# Patient Record
Sex: FEMALE | Race: BLACK OR AFRICAN AMERICAN
[De-identification: names, ages, dates, MRNs, and addresses within clinical notes are randomized per-mention and may not be internally consistent; named-entity substitution may affect disease eponyms.]

---

## 2021-08-02 ENCOUNTER — HOSPITAL ENCOUNTER (OUTPATIENT)
Dept: HOSPITAL 79 - HEART CORB | Age: 46
End: 2021-08-02
Attending: INTERNAL MEDICINE
Payer: COMMERCIAL

## 2021-08-02 DIAGNOSIS — R07.2: Primary | ICD-10-CM

## 2021-11-01 ENCOUNTER — HOSPITAL ENCOUNTER (OUTPATIENT)
Dept: GENERAL RADIOLOGY | Facility: HOSPITAL | Age: 46
Discharge: HOME | End: 2021-11-01

## 2021-11-01 ENCOUNTER — OFFICE VISIT (OUTPATIENT)
Dept: ORTHOPEDIC SURGERY | Facility: CLINIC | Age: 46
End: 2021-11-01

## 2021-11-01 VITALS
HEIGHT: 64 IN | HEART RATE: 65 BPM | BODY MASS INDEX: 49.51 KG/M2 | SYSTOLIC BLOOD PRESSURE: 138 MMHG | TEMPERATURE: 97.7 F | DIASTOLIC BLOOD PRESSURE: 79 MMHG | WEIGHT: 290 LBS

## 2021-11-01 DIAGNOSIS — M25.332 SCAPHOLUNATE DISSOCIATION, LEFT: ICD-10-CM
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
## 2021-11-01 DIAGNOSIS — S66.912A WRIST STRAIN, LEFT, INITIAL ENCOUNTER: ICD-10-CM

## 2021-11-01 DIAGNOSIS — M25.532 WRIST PAIN, ACUTE, LEFT: Primary | ICD-10-CM

## 2021-11-01 DIAGNOSIS — S56.912A FOREARM STRAIN, LEFT, INITIAL ENCOUNTER: ICD-10-CM

## 2021-11-01 DIAGNOSIS — M25.532 FOREARM JOINT PAIN, LEFT: ICD-10-CM

## 2021-11-01 PROBLEM — G47.33 OSA (OBSTRUCTIVE SLEEP APNEA): Chronic | Status: ACTIVE | Noted: 2021-11-01

## 2021-11-01 PROCEDURE — 73090 X-RAY EXAM OF FOREARM: CPT | Performed by: RADIOLOGY

## 2021-11-01 PROCEDURE — 73090 X-RAY EXAM OF FOREARM: CPT

## 2021-11-01 PROCEDURE — 73110 X-RAY EXAM OF WRIST: CPT

## 2021-11-01 PROCEDURE — 73110 X-RAY EXAM OF WRIST: CPT | Performed by: RADIOLOGY

## 2021-11-01 PROCEDURE — 99204 OFFICE O/P NEW MOD 45 MIN: CPT | Performed by: FAMILY MEDICINE

## 2021-11-01 RX ORDER — ERGOCALCIFEROL 1.25 MG/1
CAPSULE ORAL
COMMUNITY
Start: 2021-09-02

## 2021-11-01 RX ORDER — IBUPROFEN 800 MG/1
TABLET ORAL EVERY 12 HOURS
COMMUNITY
Start: 2021-09-02 | End: 2021-12-03

## 2021-11-01 RX ORDER — NAPROXEN 500 MG/1
500 TABLET ORAL 2 TIMES DAILY PRN
Qty: 60 TABLET | Refills: 2 | Status: SHIPPED | OUTPATIENT
Start: 2021-11-01 | End: 2021-12-03

## 2021-11-01 NOTE — PROGRESS NOTES
New Patient Visit      Patient: Alessandra Joiner  YOB: 1975  Date of Encounter: 2021  PCP: Hollie Boudreaux MD  Referring Provider: Hollie Boudreaux, *     Subjective   Alessandra Joiner is a 46 y.o. female who presents to the office today for evaluation of Pain, Edema, and Initial Evaluation of the Left Wrist      Chief Complaint   Patient presents with   • Left Wrist - Pain, Edema, Initial Evaluation       HPI  New patient presents for evaluation for a work-related injury that occurred on 2021.  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  States that they were outside at a property when she and another employee (Alice Reyes, sp?) were attacked by a swarm of bees.  States that she fell backwards off a concrete pad while trying to get away from them and landed onto her buttocks and outstretched left hand.  States that wrist hurt immediately but became severely more painful over the next few days and had to go to urgent care over the weekend.  Had an x-ray and was told that it was not broken. Continued to have pain and stiffness and saw her PCP again approximately a week later and had more x-rays of the wrist and forearm and was again told nothing was broken.  Patient complains of significant pain in the central wrist and distal forearm with dorsal bruising and swelling.  Patient has been treating herself with an Ace wrap, ibuprofen, and attempting range of motion exercises but the wrist is very stiff and she cannot flex or extend it without significant pain.  Denies any numbness tingling or weakness of the fingers.  Is getting burning pain on the dorsal wrist.  Patient Active Problem List   Diagnosis   • EVARISTO (obstructive sleep apnea)       History reviewed. No pertinent past medical history.    Past Surgical History:   Procedure Laterality Date   •  SECTION      2   • LAPAROSCOPIC GASTRIC BANDING     • TONSILLECTOMY         Family History   Problem  "Relation Age of Onset   • Diabetes Mother    • Rheum arthritis Mother        Social History     Socioeconomic History   • Marital status: Unknown   Tobacco Use   • Smoking status: Never Smoker   • Smokeless tobacco: Never Used   Vaping Use   • Vaping Use: Never used   Substance and Sexual Activity   • Alcohol use: Never       Current Outpatient Medications   Medication Sig Dispense Refill   • ergocalciferol (ERGOCALCIFEROL) 1.25 MG (03392 UT) capsule take 1 capsule by oral route  every week for 8 weeks     • ibuprofen (IBU) 800 MG tablet Take  by mouth Every 12 (Twelve) Hours.     • naproxen (NAPROSYN) 500 MG tablet Take 1 tablet by mouth 2 (Two) Times a Day As Needed for Moderate Pain . 60 tablet 2     No current facility-administered medications for this visit.       No Known Allergies    Review of Systems   Constitutional: Positive for activity change. Negative for fever.   Respiratory: Negative for shortness of breath.    Cardiovascular: Negative for chest pain.   Musculoskeletal: Positive for arthralgias, joint swelling and myalgias.   Skin: Positive for color change.   Neurological: Negative for weakness and numbness.       Visit Vitals  /79   Pulse 65   Temp 97.7 °F (36.5 °C)   Ht 162.6 cm (64\")   Wt 132 kg (290 lb)   BMI 49.78 kg/m²     46 y.o.female  Physical Exam  Vitals and nursing note reviewed.   Constitutional:       General: She is not in acute distress.     Appearance: Normal appearance.   Cardiovascular:      Pulses:           Radial pulses are 2+ on the left side.   Pulmonary:      Effort: Pulmonary effort is normal. No respiratory distress.   Musculoskeletal:      Left forearm: Swelling, tenderness and bony tenderness present.      Left wrist: Swelling, tenderness, bony tenderness and snuff box tenderness present. Decreased range of motion. Normal pulse.      Comments: Wrist: Very tender over proximal carpal bones worse on the dorsal side.  Dorsal bruising superficial to carpals.  Restricted " active and passive extension and flexion with pain.  Mild pain and no restriction on abduction, adduction and supination and pronation.  Strength intact.  Sensation intact.  Swelling of distal forearm and wrist.  Tender distal radius and ulna.  flexor extensor mechanism of fingers intact.   Skin:     General: Skin is warm and dry.      Findings: Bruising present. No erythema.   Neurological:      General: No focal deficit present.      Mental Status: She is alert.      Sensory: No sensory deficit.      Motor: No weakness.         Radiology Results:    X-ray left wrist 9/25/2021: No acute fracture.  Evidence of increased space between the lunate navicular bone consistent with scapholunate dissociation.  Reviewed images and agree with radiologist.    Xray left wrist and forearm: 11/1/21 - my preliminary interpretation: continued scapholunate widening.  Questionable scaphoid step-off seen on AP view only.  Otherwise no acute fractures or abnormalities.  Awaiting final results     Assessment/Plan   Diagnoses and all orders for this visit:    1. Wrist pain, acute, left (Primary)  -     XR Wrist 3+ View Left  -     XR Forearm 2 View Left  -     naproxen (NAPROSYN) 500 MG tablet; Take 1 tablet by mouth 2 (Two) Times a Day As Needed for Moderate Pain .  Dispense: 60 tablet; Refill: 2  -     MRI Wrist Left Without Contrast; Future    2. Forearm joint pain, left  -     XR Wrist 3+ View Left  -     XR Forearm 2 View Left  -     naproxen (NAPROSYN) 500 MG tablet; Take 1 tablet by mouth 2 (Two) Times a Day As Needed for Moderate Pain .  Dispense: 60 tablet; Refill: 2  -     MRI Wrist Left Without Contrast; Future    3. Wrist strain, left, initial encounter  -     XR Wrist 3+ View Left  -     XR Forearm 2 View Left  -     naproxen (NAPROSYN) 500 MG tablet; Take 1 tablet by mouth 2 (Two) Times a Day As Needed for Moderate Pain .  Dispense: 60 tablet; Refill: 2  -     MRI Wrist Left Without Contrast; Future    4. Forearm strain,  left, initial encounter  -     XR Wrist 3+ View Left  -     XR Forearm 2 View Left  -     naproxen (NAPROSYN) 500 MG tablet; Take 1 tablet by mouth 2 (Two) Times a Day As Needed for Moderate Pain .  Dispense: 60 tablet; Refill: 2  -     MRI Wrist Left Without Contrast; Future    5. Scapholunate dissociation, left  -     XR Wrist 3+ View Left  -     XR Forearm 2 View Left  -     naproxen (NAPROSYN) 500 MG tablet; Take 1 tablet by mouth 2 (Two) Times a Day As Needed for Moderate Pain .  Dispense: 60 tablet; Refill: 2  -     MRI Wrist Left Without Contrast; Future    6. Injury due to fall, initial encounter  -     XR Wrist 3+ View Left  -     XR Forearm 2 View Left  -     naproxen (NAPROSYN) 500 MG tablet; Take 1 tablet by mouth 2 (Two) Times a Day As Needed for Moderate Pain .  Dispense: 60 tablet; Refill: 2  -     MRI Wrist Left Without Contrast; Future    7. Work related injury  -     XR Wrist 3+ View Left  -     XR Forearm 2 View Left  -     naproxen (NAPROSYN) 500 MG tablet; Take 1 tablet by mouth 2 (Two) Times a Day As Needed for Moderate Pain .  Dispense: 60 tablet; Refill: 2  -     MRI Wrist Left Without Contrast; Future         MEDS ORDERED DURING VISIT:  New Medications Ordered This Visit   Medications   • naproxen (NAPROSYN) 500 MG tablet     Sig: Take 1 tablet by mouth 2 (Two) Times a Day As Needed for Moderate Pain .     Dispense:  60 tablet     Refill:  2     MEDICATION ISSUES:  Discussed medication options and treatment plan of prescribed medication as well as the risks, benefits, and side effects including potential falls, possible impaired driving and metabolic adversities among others. Patient is agreeable to call the office with any worsening of symptoms or onset of side effects. Patient is agreeable to call 911 or go to the nearest ER should he/she begin having SI/HI.     Discussion:  Requested second set of x-rays from patient's PCP.  New x-rays of wrist and forearm today in the office. Continued  questionable scapholunate widening.  Ordering MRI to fully evaluate for carpal ligament rupture and occult scaphoid injury.  Patient placed in a cock-up wrist splint and will maintain mobilization until we have the imaging.  Follow-up after MRI for results and to decide further treatment.  May use naproxen, OTC Tylenol, topical pain creams, ice and heat for pain control.  Follow-up after MRI             This document has been electronically signed by Ned Tinoco DO   November 1, 2021 16:07 EDT    Part of this note may be an electronic transcription/translation of spoken language to printed text using the Dragon Dictation System.

## 2021-11-05 ENCOUNTER — TELEPHONE (OUTPATIENT)
Dept: ORTHOPEDIC SURGERY | Facility: CLINIC | Age: 46
End: 2021-11-05

## 2021-11-05 NOTE — TELEPHONE ENCOUNTER
Patient called requesting to get her MRI done at Dr. Dubin's office in [place]. Says she is in pain and wants to see if she can get a sooner appt than 11/22. I told her I left a msg with Dr. Dubin's office and I'm just waiting and will let her know what happens. Verbalized understanding.

## 2021-11-05 NOTE — TELEPHONE ENCOUNTER
PATIENT WANTS A SOONER APPT FOR MRI. DR. DUBIN'S OFFICE IN [place] HAS BEEN CONTACTED AND CALLED BACK WITH AN APPT FOR HER. HER APPT @ DR. DUBIN'S OFFICE IS NOV 15 @ 10AM BUT NEEDS TO ARRIVE AT 9:45AM. THIS IS ONLY IF HER INSURANCE AUTHORIZES THIS VISIT. I CALLED TO INFORM PATIENT AND PATIENT VOICED UNDERSTANDING.

## 2021-11-08 ENCOUNTER — TELEPHONE (OUTPATIENT)
Dept: ORTHOPEDIC SURGERY | Facility: CLINIC | Age: 46
End: 2021-11-08

## 2021-11-08 NOTE — TELEPHONE ENCOUNTER
PATIENT CALLED BACK REGARDING HER MRI PATIENT GOT A SOONER MRI APPT W/THE [facility] ON 11/11/21 SHE SAID NO NEED TO SWITCH TO DR SALAS KEEP MRI FOR 11/11/21. IF NEEDED PATIENT CAN BE REACHED @ [phone].

## 2021-11-08 NOTE — TELEPHONE ENCOUNTER
Patient called stating that her worker's comp contact hasn't received any clinical info in order to give the approval for the MRI. I faxed documentation to Adri at [phone].

## 2021-11-12 ENCOUNTER — APPOINTMENT (OUTPATIENT)
Dept: MRI IMAGING | Facility: HOSPITAL | Age: 46
End: 2021-11-12

## 2021-11-29 ENCOUNTER — TELEPHONE (OUTPATIENT)
Dept: ORTHOPEDIC SURGERY | Facility: CLINIC | Age: 46
End: 2021-11-29

## 2021-11-29 NOTE — TELEPHONE ENCOUNTER
Pt returned my call and she has had the MRI she is there, she was handed the disc while we were on the phone, the report should be ready around Wednesday and Thursday.

## 2021-11-29 NOTE — TELEPHONE ENCOUNTER
I tried calling the patient to see if she has had her MRI, voicemaill has not been set up yet. I called her mother, Maggie, and left my number with her to give to her daughter when she speaks/sees her.

## 2021-12-01 ENCOUNTER — TELEPHONE (OUTPATIENT)
Dept: ORTHOPEDIC SURGERY | Facility: CLINIC | Age: 46
End: 2021-12-01

## 2021-12-01 NOTE — TELEPHONE ENCOUNTER
Patient called asking if we received her MRI report. At the time she called I did tnot have it but I have received it now and have scheduled the patient to come in so she and Dr. Tinoco can go over. Verbalized understanding

## 2021-12-03 ENCOUNTER — OFFICE VISIT (OUTPATIENT)
Dept: ORTHOPEDIC SURGERY | Facility: CLINIC | Age: 46
End: 2021-12-03

## 2021-12-03 ENCOUNTER — TELEPHONE (OUTPATIENT)
Dept: ORTHOPEDIC SURGERY | Facility: CLINIC | Age: 46
End: 2021-12-03

## 2021-12-03 VITALS
DIASTOLIC BLOOD PRESSURE: 90 MMHG | WEIGHT: 290 LBS | SYSTOLIC BLOOD PRESSURE: 135 MMHG | BODY MASS INDEX: 49.51 KG/M2 | HEIGHT: 64 IN | HEART RATE: 77 BPM
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
## 2021-12-03 DIAGNOSIS — S63.512A SPRAIN OF LEFT SCAPHOLUNATE LIGAMENT: Primary | ICD-10-CM

## 2021-12-03 DIAGNOSIS — M25.532 WRIST PAIN, ACUTE, LEFT: ICD-10-CM

## 2021-12-03 DIAGNOSIS — S56.912D FOREARM STRAIN, LEFT, SUBSEQUENT ENCOUNTER: ICD-10-CM

## 2021-12-03 DIAGNOSIS — M94.232: ICD-10-CM

## 2021-12-03 DIAGNOSIS — S66.912D WRIST STRAIN, LEFT, SUBSEQUENT ENCOUNTER: ICD-10-CM

## 2021-12-03 PROCEDURE — 99214 OFFICE O/P EST MOD 30 MIN: CPT | Performed by: FAMILY MEDICINE

## 2021-12-03 RX ORDER — DICLOFENAC EPOLAMINE 0.01 G/1
1 SYSTEM TOPICAL 2 TIMES DAILY
Qty: 60 PATCH | Refills: 2 | Status: SHIPPED | OUTPATIENT
Start: 2021-12-03

## 2021-12-03 NOTE — TELEPHONE ENCOUNTER
Called patient to see where she would like to do her OT. She wants it in done in [place], I found [facility], she said that is fine. Informed her that either they or I will let her know of an appt date and time. Verbalized understanding.

## 2021-12-03 NOTE — PROGRESS NOTES
"Follow Up Workmen's Compensation visit      Patient: Alessandra Joiner  YOB: 1975  Date of Encounter: 12/03/2021  PCP: Hollie Boudreaux MD  Referring Provider: No ref. provider found     Subjective   Alessandra Joiner is a 46 y.o. female who presents to the office today for evaluation of Pain, Follow-up, and MRI Review of the Left Wrist      Chief Complaint   Patient presents with   • Left Wrist - Pain, Follow-up, MRI Review       HPI  Patient presents for follow-up for left wrist strain and MRI review for a work-related injury that occurred on 9/23/2021.  Patient has been wearing her cock-up wrist splint.  Tried taking naproxen but says it did not work as well as the ibuprofen so she switched back.  Patient still having significant pain and stiffness in the wrist with flexion and extension .  Denies any numbness or tingling.  States that the forearm pain has improved but still gets twinges occasionally.  Patient Active Problem List   Diagnosis   • EVARISTO (obstructive sleep apnea)       History reviewed. No pertinent past medical history.    No Known Allergies    Review of Systems   Constitutional: Positive for activity change. Negative for fever.   Respiratory: Negative for shortness of breath.    Cardiovascular: Negative for chest pain.   Musculoskeletal: Positive for arthralgias and myalgias. Negative for joint swelling.   Skin: Positive for color change.   Neurological: Negative for weakness and numbness.       Visit Vitals  /90   Pulse 77   Ht 162.6 cm (64\")   Wt 132 kg (290 lb)   BMI 49.78 kg/m²     46 y.o.female  Physical Exam  Vitals and nursing note reviewed.   Constitutional:       General: She is not in acute distress.     Appearance: Normal appearance.   Cardiovascular:      Pulses:           Radial pulses are 2+ on the left side.   Pulmonary:      Effort: Pulmonary effort is normal. No respiratory distress.   Musculoskeletal:      Left forearm: No swelling, tenderness or bony " tenderness.      Left wrist: Tenderness and bony tenderness present. No swelling or snuff box tenderness. Decreased range of motion. Normal pulse.      Comments: Wrist: tender over proximal carpal bones worse on the dorsal side. Restricted active and passive extension and flexion with pain.  Mild pain and no restriction on abduction, adduction and supination and pronation.  Strength intact.  Sensation intact.   flexor extensor mechanism of fingers intact.   Skin:     General: Skin is warm and dry.      Findings: No bruising or erythema.   Neurological:      General: No focal deficit present.      Mental Status: She is alert.      Sensory: No sensory deficit.      Motor: No weakness.       Radiology Results:    MRI Left wrist w/o contrast: 12/3/21  1.  Widening of scapholunate interval, consistent with intermediate grade sprain sequela of the scapholunate ligament.  Defer to ulnar and radial views regarding potential instability  2.  Capsulitis   3.  Extensor carpi ulnaris mild tendinopathy and peritendinitis without tear.  4.  Ulnar abutment related changes including high-grade chondromalacia of the lunate and triquetrum.  No TFC tear  See full scanned report    XR Wrist 3+ View Left  Narrative: XR WRIST 3+ VW LEFT-     REASON FOR EXAM:  FOOSH injury 9/23. continued pain and stiffness.  scapholunate widening on xray from urgent care; M25.532-Pain in left  wrist; M25.532-Pain in left wrist; S66.912A-Strain of unspecified  muscle, fascia and tendon at wrist and hand level, left hand, initial  encounter; S56.912A-Strain of unspecified muscles, fascia and tendons at  forearm level, left arm, initial encounter; M25.332-Other instability, l     Comparison:None     The bone structures are intact. There were no findings to suggest acute  fracture dislocation or bony destructive change. No periosteal  thickening is noted. There are no radiopaque foreign bodies in the soft  tissues.     Impression: Negative left wrist      This report was finalized on 11/1/2021 3:50 PM by Dr. Jeremiah Marvin II, MD.     XR Forearm 2 View Left  Narrative: XR FOREARM 2 VW LEFT-     REASON FOR EXAM:  FOOSH injury 9/23/21. distal forearm pain and  swelling. suspected wrist scapholunate dissociation; M25.532-Pain in  left wrist; M25.532-Pain in left wrist; S66.912A-Strain of unspecified  muscle, fascia and tendon at wrist and hand level, left hand, initial  encounter; S56.912A-Strain of unspecified muscles, fascia and tendons at  forearm level, left arm, initial encounter; M25.332-Other instability,     Comparison:None     The bone structures are intact. There were no findings to suggest acute  fracture dislocation or bony destructive change. No periosteal  thickening is noted. There are no radiopaque foreign bodies in the soft  tissues.     Impression: Negative left forearm     This report was finalized on 11/1/2021 3:50 PM by Dr. Jeremiah Marvin II, MD.         Assessment/Plan   Diagnoses and all orders for this visit:    1. Sprain of left scapholunate ligament - Intermediate grade - mild widening (Primary)  -     Ambulatory Referral to Occupational Therapy  -     Diclofenac Epolamine (FLECTOR) 1.3 % patch patch; Apply 1 patch topically to the appropriate area as directed 2 (Two) Times a Day.  Dispense: 60 patch; Refill: 2    2. Wrist pain, acute, left  -     Ambulatory Referral to Occupational Therapy  -     Diclofenac Epolamine (FLECTOR) 1.3 % patch patch; Apply 1 patch topically to the appropriate area as directed 2 (Two) Times a Day.  Dispense: 60 patch; Refill: 2    3. Wrist strain, left, subsequent encounter  -     Ambulatory Referral to Occupational Therapy  -     Diclofenac Epolamine (FLECTOR) 1.3 % patch patch; Apply 1 patch topically to the appropriate area as directed 2 (Two) Times a Day.  Dispense: 60 patch; Refill: 2    4. Work related injury  -     Ambulatory Referral to Occupational Therapy  -     Diclofenac Epolamine (FLECTOR) 1.3 %  patch patch; Apply 1 patch topically to the appropriate area as directed 2 (Two) Times a Day.  Dispense: 60 patch; Refill: 2    5. Forearm strain, left, subsequent encounter  -     Ambulatory Referral to Occupational Therapy  -     Diclofenac Epolamine (FLECTOR) 1.3 % patch patch; Apply 1 patch topically to the appropriate area as directed 2 (Two) Times a Day.  Dispense: 60 patch; Refill: 2    6. Chondromalacia of left wrist  -     Ambulatory Referral to Occupational Therapy  -     Diclofenac Epolamine (FLECTOR) 1.3 % patch patch; Apply 1 patch topically to the appropriate area as directed 2 (Two) Times a Day.  Dispense: 60 patch; Refill: 2         MEDS ORDERED DURING VISIT:  New Medications Ordered This Visit   Medications   • Diclofenac Epolamine (FLECTOR) 1.3 % patch patch     Sig: Apply 1 patch topically to the appropriate area as directed 2 (Two) Times a Day.     Dispense:  60 patch     Refill:  2     MEDICATION ISSUES:  Discussed medication options and treatment plan of prescribed medication as well as the risks, benefits, and side effects including potential falls, possible impaired driving and metabolic adversities among others. Patient is agreeable to call the office with any worsening of symptoms or onset of side effects. Patient is agreeable to call 911 or go to the nearest ER should he/she begin having SI/HI.     Discussion:  Reviewed MRI with patient and discussed treatment options.  Patient has a grade 2 strain of the scapholunate ligament but it appears to be intact on MRI.  Also fairly significant chondromalacia changes.  Recommend rehab with occupational therapy.  Consider further intervention or referral to hand surgeon if not improving.  Naproxen was not helpful so we will try Flector patches that she can put directly over the wrist.  Follow-up in 1 month or sooner if needed.  Gave patient gentle range of motion exercises for the wrist.  Recommend she continue sleeping in the brace and using it  when she is working but should take it off in between to avoid stiffness and wear it less as pain improves..             This document has been electronically signed by Ned Tinoco DO   December 3, 2021 09:58 EST    Part of this note may be an electronic transcription/translation of spoken language to printed text using the Dragon Dictation System.

## 2022-01-05 ENCOUNTER — OFFICE VISIT (OUTPATIENT)
Dept: ORTHOPEDIC SURGERY | Facility: CLINIC | Age: 47
End: 2022-01-05

## 2022-01-05 VITALS
DIASTOLIC BLOOD PRESSURE: 78 MMHG | HEIGHT: 64 IN | SYSTOLIC BLOOD PRESSURE: 113 MMHG | HEART RATE: 65 BPM | BODY MASS INDEX: 49.51 KG/M2 | WEIGHT: 290 LBS

## 2022-01-05 DIAGNOSIS — M79.89 LEFT ARM SWELLING: ICD-10-CM

## 2022-01-05 DIAGNOSIS — S66.912D WRIST STRAIN, LEFT, SUBSEQUENT ENCOUNTER: ICD-10-CM

## 2022-01-05 DIAGNOSIS — M25.532 FOREARM JOINT PAIN, LEFT: ICD-10-CM

## 2022-01-05 DIAGNOSIS — M94.232: ICD-10-CM

## 2022-01-05 DIAGNOSIS — M25.532 WRIST PAIN, ACUTE, LEFT: ICD-10-CM
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
## 2022-01-05 DIAGNOSIS — S63.512A SPRAIN OF LEFT SCAPHOLUNATE LIGAMENT: Primary | ICD-10-CM

## 2022-01-05 DIAGNOSIS — S56.912D FOREARM STRAIN, LEFT, SUBSEQUENT ENCOUNTER: ICD-10-CM

## 2022-01-05 PROCEDURE — 76882 US LMTD JT/FCL EVL NVASC XTR: CPT | Performed by: FAMILY MEDICINE

## 2022-01-05 PROCEDURE — 99213 OFFICE O/P EST LOW 20 MIN: CPT | Performed by: FAMILY MEDICINE

## 2022-01-05 NOTE — PROGRESS NOTES
"Follow Up Visit      Patient: Alessandra Joiner  YOB: 1975  Date of Encounter: 01/05/2022  PCP: Hollie Boudreaux MD  Referring Provider: No ref. provider found     Subjective   Alessandra Joiner is a 46 y.o. female who presents to the office today for evaluation of Pain and Follow-up of the Left Wrist      Chief Complaint   Patient presents with   • Left Wrist - Pain, Follow-up       HPI  Patient presents for follow-up for left wrist strain, scapholunate ligament strain and pain from work-related injury.  Patient has been in PT for 1 month now and reports that she is 30 to 40% improved in range of motion and pain.  Reports that she had an episode of distal forearm swelling after particularly aggressive PT but then once the swelling resolves her range of motion was notably improved.  Still has some mild swelling and tenderness in that area.  Is only wearing her cock-up splint now when sleeping and driving.  Tries not to wear it any more than that to avoid getting stiff.  Still gets significant pain and stiffness on wrist extension.  Patient has been taking ibuprofen 800 mg twice a day.  Has not tried to Flector patches yet because she lost the medical card she was given to pay for them  Patient Active Problem List   Diagnosis   • EVARISTO (obstructive sleep apnea)       History reviewed. No pertinent past medical history.    No Known Allergies    Review of Systems   Constitutional: Positive for activity change. Negative for fever.   Respiratory: Negative for shortness of breath.    Cardiovascular: Negative for chest pain.   Musculoskeletal: Positive for arthralgias and myalgias. Negative for joint swelling.   Skin: Positive for color change.   Neurological: Negative for weakness and numbness.       Visit Vitals  /78   Pulse 65   Ht 162.6 cm (64\")   Wt 132 kg (290 lb)   BMI 49.78 kg/m²     46 y.o.female  Physical Exam  Vitals and nursing note reviewed.   Constitutional:       General: She is " not in acute distress.     Appearance: Normal appearance.   Cardiovascular:      Pulses:           Radial pulses are 2+ on the left side.   Pulmonary:      Effort: Pulmonary effort is normal. No respiratory distress.   Musculoskeletal:      Left forearm: Swelling and tenderness present. No bony tenderness.      Left wrist: Tenderness and bony tenderness present. No swelling or snuff box tenderness. Decreased range of motion. Normal pulse.        Arms:       Comments: Wrist: tender over proximal carpal bones on the dorsal side. Restricted active and passive extension with pain. Range is improved from previous.  Mild restriction on adduction and supination and pronation.  Strength intact.  Sensation intact.   flexor extensor mechanism of fingers intact.    Mild localized swelling in distal dorsal forearm   Skin:     General: Skin is warm and dry.      Findings: No bruising or erythema.   Neurological:      General: No focal deficit present.      Mental Status: She is alert.      Sensory: No sensory deficit.      Motor: No weakness.       [facility] Ultrasound Exam Limited: left forarm  Patient given verbal consent for exam  Indication: Pain, localized swelling  No localized fluid collection or hematoma was visualized in the dorsal forearm.  Musculature appears edematous in the swollen area.  Images saved on ultrasound machine    Assessment/Plan   Diagnoses and all orders for this visit:    1. Sprain of left scapholunate ligament - Intermediate grade - mild widening (Primary)    2. Left arm swelling    3. Wrist strain, left, subsequent encounter    4. Wrist pain, acute, left    5. Work related injury    6. Chondromalacia of left wrist    7. Forearm strain, left, subsequent encounter    8. Forearm joint pain, left         MEDS ORDERED DURING VISIT:  No orders of the defined types were placed in this encounter.    MEDICATION ISSUES:  Discussed medication options and treatment plan of prescribed medication as well as the risks,  benefits, and side effects including potential falls, possible impaired driving and metabolic adversities among others. Patient is agreeable to call the office with any worsening of symptoms or onset of side effects. Patient is agreeable to call 911 or go to the nearest ER should he/she begin having SI/HI.     Discussion:  Patient is slowly but steadily improving.  Recommend continued PT/OT.  No sign of localized fluid collection or consolidated hematoma on ultrasound.  Swelling should continue to resolve.  Follow-up in 1 month.  If improvement plateaus or worsens would consider further intervention or referral to hand/wrist specialist             This document has been electronically signed by Ned Tinoco DO   January 5, 2022 14:05 EST    Part of this note may be an electronic transcription/translation of spoken language to printed text using the Dragon Dictation System.

## 2022-04-14 ENCOUNTER — TRANSCRIBE ORDERS (OUTPATIENT)
Dept: ADMINISTRATIVE | Facility: HOSPITAL | Age: 47
End: 2022-04-14

## 2022-04-14 DIAGNOSIS — Z98.84 BARIATRIC SURGERY STATUS: Primary | ICD-10-CM

## 2022-04-19 ENCOUNTER — HOSPITAL ENCOUNTER (OUTPATIENT)
Dept: GENERAL RADIOLOGY | Facility: HOSPITAL | Age: 47
Discharge: HOME | End: 2022-04-19
Admitting: SURGERY

## 2022-04-19 DIAGNOSIS — Z98.84 BARIATRIC SURGERY STATUS: ICD-10-CM

## 2022-04-19 PROCEDURE — 74246 X-RAY XM UPR GI TRC 2CNTRST: CPT

## 2022-04-19 PROCEDURE — 74246 X-RAY XM UPR GI TRC 2CNTRST: CPT | Performed by: RADIOLOGY
